# Patient Record
Sex: FEMALE | Race: WHITE
[De-identification: names, ages, dates, MRNs, and addresses within clinical notes are randomized per-mention and may not be internally consistent; named-entity substitution may affect disease eponyms.]

---

## 2019-06-09 ENCOUNTER — HOSPITAL ENCOUNTER (EMERGENCY)
Dept: HOSPITAL 91 - FTE | Age: 74
Discharge: HOME | End: 2019-06-09
Payer: MEDICARE

## 2019-06-09 ENCOUNTER — HOSPITAL ENCOUNTER (EMERGENCY)
Dept: HOSPITAL 10 - FTE | Age: 74
Discharge: HOME | End: 2019-06-09
Payer: MEDICARE

## 2019-06-09 VITALS
BODY MASS INDEX: 29.73 KG/M2 | HEIGHT: 63 IN | BODY MASS INDEX: 29.73 KG/M2 | HEIGHT: 63 IN | WEIGHT: 167.77 LBS | WEIGHT: 167.77 LBS

## 2019-06-09 VITALS — DIASTOLIC BLOOD PRESSURE: 64 MMHG | RESPIRATION RATE: 16 BRPM | SYSTOLIC BLOOD PRESSURE: 138 MMHG | HEART RATE: 78 BPM

## 2019-06-09 DIAGNOSIS — B08.4: Primary | ICD-10-CM

## 2019-06-09 DIAGNOSIS — I10: ICD-10-CM

## 2019-06-09 PROCEDURE — 99283 EMERGENCY DEPT VISIT LOW MDM: CPT

## 2019-06-09 RX ADMIN — ACETAMINOPHEN 1 MG: 325 TABLET, FILM COATED ORAL at 20:43

## 2019-06-09 NOTE — ERD
ER Documentation


Chief Complaint


Chief Complaint





allergic reaction; bilateral arm and leg blisters x 2 days





HPI


74 fEMALE presents with painful blisters on her hands for last 2 days.  She has 


a few on her lower extremities.  She also has a sore throat.  She denies fevers.


 She took some antibiotics and finished them last week for URI symptoms.  She 


denies vomiting, abdominal pain, chest pain.





ROS


All systems reviewed and are negative except as per history of present illness.





Allergies


Allergies:  


Coded Allergies:  


     bacitracin (Verified  Allergy, Unknown, 6/9/19)


     bupropion (Verified  Allergy, Unknown, hives, 6/9/19)


     cephalexin (Verified  Allergy, Unknown, hives, 6/9/19)


     quinine (Verified  Allergy, Unknown, 6/9/19)





Physical Exam


Vitals





Vital Signs


  Date      Temp  Pulse  Resp  B/P (MAP)   Pulse Ox  O2          O2 Flow    FiO2


Time                                                 Delivery    Rate


    6/9/19  97.1     75    18      142/65       100


     20:00                           (90)





Physical Exam


Const:   No acute distress


Head:   Atraumatic 


Eyes:    Normal Conjunctiva


ENT:    Normal External Ears, Nose and Mouth.  Irritation of the throat but no 


obvious redness, exudate.  Airway patent.


Neck:               Full range of motion. No meningismus.


Resp:   Clear to auscultation bilaterally


Cardio:   Regular rate and rhythm, no murmurs


Abd:    Soft, non tender, non distended. Normal bowel sounds


Skin:   No petechiae or purpura.  Vesicular lesions on the palms.  Possible few 


scattered possible early vesicular lesions on the feet.


Back:   No midline or flank tenderness


Ext:    No cyanosis, or edema


Neur:   Awake and alert


Psych:    Normal Mood and Affect


Results 24 hrs





Current Medications


 Medications
   Dose
          Sig/Kalani
       Start Time
   Status  Last


 (Trade)       Ordered        Route
 PRN     Stop Time              Admin
Dose


                              Reason                                Admin


                8 mg           ONCE  ONCE
    6/9/19        DC            6/9/19


Dexamethasone                 PO
            20:30
 6/9/19                20:50




  (Decadron)                                20:31


                650 mg         ONCE  ONCE
    6/9/19        DC       



Acetaminophen                 PO
            20:30
 6/9/19



  (Tylenol                                  20:31


Tab)








Procedures/MDM


Patient presents with sore throat, rash on her hands mildly on her feet.  I 


suspect adult hand-foot-and-mouth disease.  There is no signs of cellulitis, 


scalded skin, sepsis, additional concerning signs or symptoms.  Is no evidence 


of life-threatening rashes or purpura.  She will be treated with Decadron 1 dose


here, Tylenol, further observation at home and return precautions worsening 


redness, fevers, shortness of breath, vomiting or abdominal pain, new worsening 


symptoms.  The patient was stable with no new complaints during the ER course. 


Clinically, there is no current evidence to suggest meningitis, sepsis, acute 


abdomen, pneumonia, stroke,  acute coronary syndrome, pulmonary embolism, aortic


dissection or any other emergent condition appearing to require further 


evaluation or hospitalization.  Patient counseled regarding my diagnostic 


impression and care plan. Prior to discharge all questions answered. Pt agrees 


with treatment plan and understands strict return precautions. Pt is instructed 


to follow up with primary care provider within 24-48 hours. Precautionary 


instructions provided including instructions to return to the ER if not impro


ving or for any worsening or changing symptoms or concerns.





Disclaimer: Inadvertent spelling and grammatical errors are likely due to 


EHR/dictation software use and do not reflect on the overall quality of patient 


care. Also, please note that the electronic time recorded on this note does not 


necessarily reflect the actual time of the patient encounter.  The patient's 


blood pressure was elevated (>120/80) but appears stable without evidence of 


hypertension emergency or urgency.  The patient was counseled about the risks of


hypertension and urged to pursue outpatient monitoring and therapy within a week


with their primary care physician.





I discussed the findings with the patient. I advised the patient to follow-up 


with the primary physician in about 1-2 days, sooner if needed and return if any


concern.





Departure


Diagnosis:  


   Primary Impression:  


   Hand, foot and mouth disease


   Additional Impression:  


   Hypertension


   Hypertension type:  unspecified  Qualified Codes:  I10 - Essential (primary) 


   hypertension


Condition:  Stable


Patient Instructions:  Hand Foot Mouth Disease (Child)





Additional Instructions:  


Suspect viral rash or hand-foot mouth disease which usually lasts up to a week. 


Recommend Tylenol every 4 hours.  Recheck for fevers, worsening redness, 


shortness of breath, new worsening symptoms.











SUNG BLANCO MD              Jun 9, 2019 20:32